# Patient Record
Sex: FEMALE | Race: WHITE | Employment: OTHER | URBAN - NONMETROPOLITAN AREA
[De-identification: names, ages, dates, MRNs, and addresses within clinical notes are randomized per-mention and may not be internally consistent; named-entity substitution may affect disease eponyms.]

---

## 2025-04-16 ENCOUNTER — OFFICE VISIT (OUTPATIENT)
Dept: NEUROLOGY | Age: 85
End: 2025-04-16
Payer: MEDICARE

## 2025-04-16 VITALS
WEIGHT: 131 LBS | BODY MASS INDEX: 21.05 KG/M2 | HEIGHT: 66 IN | HEART RATE: 80 BPM | DIASTOLIC BLOOD PRESSURE: 76 MMHG | SYSTOLIC BLOOD PRESSURE: 136 MMHG | OXYGEN SATURATION: 97 %

## 2025-04-16 DIAGNOSIS — R25.8 BRADYKINESIA: ICD-10-CM

## 2025-04-16 DIAGNOSIS — G20.A2 PARKINSON'S DISEASE WITHOUT DYSKINESIA, WITH FLUCTUATING MANIFESTATIONS (HCC): Primary | ICD-10-CM

## 2025-04-16 DIAGNOSIS — R49.8 HYPOPHONIA: ICD-10-CM

## 2025-04-16 DIAGNOSIS — R26.89 SHUFFLING GAIT: ICD-10-CM

## 2025-04-16 DIAGNOSIS — R25.1 TREMOR OF RIGHT HAND: ICD-10-CM

## 2025-04-16 DIAGNOSIS — M89.9 DISORDER OF BONE, UNSPECIFIED: ICD-10-CM

## 2025-04-16 DIAGNOSIS — R29.898 WEAKNESS OF BOTH HANDS: ICD-10-CM

## 2025-04-16 PROCEDURE — 1159F MED LIST DOCD IN RCRD: CPT | Performed by: PSYCHIATRY & NEUROLOGY

## 2025-04-16 PROCEDURE — 4004F PT TOBACCO SCREEN RCVD TLK: CPT | Performed by: PSYCHIATRY & NEUROLOGY

## 2025-04-16 PROCEDURE — 1090F PRES/ABSN URINE INCON ASSESS: CPT | Performed by: PSYCHIATRY & NEUROLOGY

## 2025-04-16 PROCEDURE — G8400 PT W/DXA NO RESULTS DOC: HCPCS | Performed by: PSYCHIATRY & NEUROLOGY

## 2025-04-16 PROCEDURE — G8420 CALC BMI NORM PARAMETERS: HCPCS | Performed by: PSYCHIATRY & NEUROLOGY

## 2025-04-16 PROCEDURE — 99205 OFFICE O/P NEW HI 60 MIN: CPT | Performed by: PSYCHIATRY & NEUROLOGY

## 2025-04-16 PROCEDURE — G8427 DOCREV CUR MEDS BY ELIG CLIN: HCPCS | Performed by: PSYCHIATRY & NEUROLOGY

## 2025-04-16 PROCEDURE — 1123F ACP DISCUSS/DSCN MKR DOCD: CPT | Performed by: PSYCHIATRY & NEUROLOGY

## 2025-04-16 RX ORDER — MULTIVITAMIN WITH IRON
1 TABLET ORAL DAILY
COMMUNITY

## 2025-04-16 RX ORDER — CARBIDOPA AND LEVODOPA 25; 100 MG/1; MG/1
1 TABLET ORAL 3 TIMES DAILY
Qty: 90 TABLET | Refills: 3 | Status: SHIPPED | OUTPATIENT
Start: 2025-04-16

## 2025-04-16 RX ORDER — LOSARTAN POTASSIUM AND HYDROCHLOROTHIAZIDE 12.5; 1 MG/1; MG/1
1 TABLET ORAL 2 TIMES DAILY
COMMUNITY

## 2025-04-16 RX ORDER — CALCIUM CARBONATE/VITAMIN D3 600 MG-10
2 TABLET ORAL DAILY
COMMUNITY

## 2025-04-16 RX ORDER — LEVOTHYROXINE SODIUM 75 UG/1
75 TABLET ORAL
COMMUNITY

## 2025-04-16 RX ORDER — GLUCOSAMINE SULFATE DIPOT CHLR 1000 MG
2 TABLET ORAL DAILY
COMMUNITY

## 2025-04-16 RX ORDER — ASCORBIC ACID 500 MG
500 TABLET ORAL DAILY
COMMUNITY

## 2025-04-16 RX ORDER — AMLODIPINE BESYLATE 5 MG/1
5 TABLET ORAL DAILY
COMMUNITY
Start: 2025-02-10

## 2025-04-16 RX ORDER — PRAVASTATIN SODIUM 40 MG
40 TABLET ORAL DAILY
COMMUNITY
Start: 2025-03-06

## 2025-04-16 RX ORDER — MULTIVIT WITH MINERALS/LUTEIN
400 TABLET ORAL DAILY
COMMUNITY

## 2025-04-16 RX ORDER — METOPROLOL SUCCINATE 50 MG/1
50 TABLET, EXTENDED RELEASE ORAL DAILY
COMMUNITY
Start: 2025-02-10

## 2025-04-16 NOTE — PATIENT INSTRUCTIONS
MRI cervical spine WO contrast  Start Sinemet 25/100 mg three times a day, take at 7am, 12pm, and 5 pm daily  Vitamin B12, folate  Vitamin B6 level  Vitamin D level  Referral to physical  therapy re: parkinson's related exercises.   Call with any new symptoms or concerns.   Follow up in 4-6 weeks

## 2025-04-16 NOTE — PROGRESS NOTES
Chief Complaint   Patient presents with    New Patient     Tremor        Anais Arshad is a 84 y.o. female who presents today for evaluation of right hand resting tremor, for the past 3-4 years that has progressively gotten worse. She feels slower with her walking, no falls. Her voice pitch is lower.   Her hand writing is smaller. She struggles getting out of chairs and low lying couches. She shuffles her feet when she walks. No dysphagia. No hypersalivation, no vivid dream or hallucination. She has history of MS diagnosed in 2006, she has not seen neurology since. She had never been on medication for the MS. She   denies chest pain. No shortness of breath, no neck pain. No vision changes. No dysphagia. No fever. No rash. No weight loss.  History provided by patient accompanied by her      Past Medical History:   Diagnosis Date    Hearing loss     Hypertension     Multiple sclerosis (HCC)     Tremor        There is no problem list on file for this patient.      Allergies   Allergen Reactions    Erythromycin Nausea Only     PT STATES CAN TAKE ZITHROMAX OKAY    Penicillins Hives and Rash    Sulfa Antibiotics Hives and Rash       Current Outpatient Medications   Medication Sig Dispense Refill    amLODIPine (NORVASC) 5 MG tablet Take 1 tablet by mouth daily      ascorbic acid (VITAMIN C) 500 MG tablet Take 1 tablet by mouth daily      B Complex-Biotin-FA (BALANCED B-50/FA) TABS Take 1 tablet by mouth daily      levothyroxine (SYNTHROID) 75 MCG tablet Take 1 tablet by mouth every morning (before breakfast)      losartan-hydroCHLOROthiazide (HYZAAR) 100-12.5 MG per tablet Take 1 tablet by mouth 2 times daily      metoprolol succinate (TOPROL XL) 50 MG extended release tablet Take 1 tablet by mouth daily      pravastatin (PRAVACHOL) 40 MG tablet Take 1 tablet by mouth daily      Calcium Carb-Cholecalciferol (CALCIUM CARBONATE+VITAMIN D PO) Take 1 tablet by mouth daily      Nutritional Supplements (GLUCOSAMINE

## 2025-04-18 ENCOUNTER — HOSPITAL ENCOUNTER (OUTPATIENT)
Dept: MRI IMAGING | Age: 85
Discharge: HOME | End: 2025-04-18
Attending: RADIOLOGY

## 2025-04-18 DIAGNOSIS — Z00.6 EXAMINATION FOR NORMAL COMPARISON FOR CLINICAL RESEARCH: ICD-10-CM

## 2025-05-16 ENCOUNTER — RESULTS FOLLOW-UP (OUTPATIENT)
Dept: NEUROLOGY | Age: 85
End: 2025-05-16

## 2025-05-16 ENCOUNTER — HOSPITAL ENCOUNTER (OUTPATIENT)
Dept: MRI IMAGING | Age: 85
Discharge: HOME | End: 2025-05-16
Payer: MEDICARE

## 2025-05-16 DIAGNOSIS — R25.1 TREMOR OF RIGHT HAND: ICD-10-CM

## 2025-05-16 DIAGNOSIS — R49.8 HYPOPHONIA: ICD-10-CM

## 2025-05-16 DIAGNOSIS — G20.A2 PARKINSON'S DISEASE WITHOUT DYSKINESIA, WITH FLUCTUATING MANIFESTATIONS (HCC): ICD-10-CM

## 2025-05-16 DIAGNOSIS — R26.89 SHUFFLING GAIT: ICD-10-CM

## 2025-05-16 DIAGNOSIS — R29.898 WEAKNESS OF BOTH HANDS: ICD-10-CM

## 2025-05-16 DIAGNOSIS — R25.8 BRADYKINESIA: ICD-10-CM

## 2025-05-16 DIAGNOSIS — R93.7 ABNORMAL MRI, CERVICAL SPINE: Primary | ICD-10-CM

## 2025-05-16 PROCEDURE — 72141 MRI NECK SPINE W/O DYE: CPT

## 2025-05-16 NOTE — TELEPHONE ENCOUNTER
Spoke to the patient regarding results. Verbalized understanding. Patient agreeable to proceed with referral to [facility] no provider preference. Please advise.

## 2025-05-23 ENCOUNTER — OFFICE VISIT (OUTPATIENT)
Dept: NEUROLOGY | Age: 85
End: 2025-05-23
Payer: MEDICARE

## 2025-05-23 VITALS
WEIGHT: 131 LBS | DIASTOLIC BLOOD PRESSURE: 72 MMHG | SYSTOLIC BLOOD PRESSURE: 128 MMHG | HEART RATE: 74 BPM | BODY MASS INDEX: 21.05 KG/M2 | OXYGEN SATURATION: 97 % | HEIGHT: 66 IN

## 2025-05-23 DIAGNOSIS — G20.A2 PARKINSON'S DISEASE WITHOUT DYSKINESIA, WITH FLUCTUATING MANIFESTATIONS (HCC): Primary | ICD-10-CM

## 2025-05-23 DIAGNOSIS — R25.1 TREMOR OF RIGHT HAND: ICD-10-CM

## 2025-05-23 DIAGNOSIS — R25.8 BRADYKINESIA: ICD-10-CM

## 2025-05-23 DIAGNOSIS — R49.8 HYPOPHONIA: ICD-10-CM

## 2025-05-23 DIAGNOSIS — R29.898 WEAKNESS OF BOTH HANDS: ICD-10-CM

## 2025-05-23 DIAGNOSIS — R26.89 SHUFFLING GAIT: ICD-10-CM

## 2025-05-23 DIAGNOSIS — R93.7 ABNORMAL MRI, CERVICAL SPINE: ICD-10-CM

## 2025-05-23 PROCEDURE — 1123F ACP DISCUSS/DSCN MKR DOCD: CPT | Performed by: NURSE PRACTITIONER

## 2025-05-23 PROCEDURE — G8400 PT W/DXA NO RESULTS DOC: HCPCS | Performed by: NURSE PRACTITIONER

## 2025-05-23 PROCEDURE — 1090F PRES/ABSN URINE INCON ASSESS: CPT | Performed by: NURSE PRACTITIONER

## 2025-05-23 PROCEDURE — G8427 DOCREV CUR MEDS BY ELIG CLIN: HCPCS | Performed by: NURSE PRACTITIONER

## 2025-05-23 PROCEDURE — G8420 CALC BMI NORM PARAMETERS: HCPCS | Performed by: NURSE PRACTITIONER

## 2025-05-23 PROCEDURE — 99214 OFFICE O/P EST MOD 30 MIN: CPT | Performed by: NURSE PRACTITIONER

## 2025-05-23 PROCEDURE — 1159F MED LIST DOCD IN RCRD: CPT | Performed by: NURSE PRACTITIONER

## 2025-05-23 PROCEDURE — 1036F TOBACCO NON-USER: CPT | Performed by: NURSE PRACTITIONER

## 2025-05-23 RX ORDER — CARBIDOPA AND LEVODOPA 25; 100 MG/1; MG/1
1.5 TABLET ORAL 3 TIMES DAILY
Qty: 135 TABLET | Refills: 3 | Status: SHIPPED | OUTPATIENT
Start: 2025-05-23

## 2025-05-23 NOTE — PROGRESS NOTES
Weight: 59.4 kg (131 lb)   Height: 1.676 m (5' 6\")     General Appearance:  alert and cooperative  Gen: NAD, Language is Intact. Skin: no rash, lesion, dry  t to touch. warm  Head: no rash, no icterus  Neck: There is no carotid bruits. The Neck is supple.     Neuro: CN 2-12 grossly intact with no focal deficits. Power 5/5 Throughout symmetric, Reflexes are +2 symmetric. Long tracts are intact. Cerebellar exam is Intact. Sensory exam is intact to light touch.  Gait is intact (improved), .+ve right hand> than left resting tremor,(improved) +ve right hand pinrolling(improved) +be bradykinesia,(improved) +ve Hypohonia(improved), +ve decreased blink rate(improved), +ve difficulty exiting chair(improved), +ve decreased arm swing, +ve stooped forward(improved),   Musculoskeletal:  Has no hand arthritis, no limitation of ROM in any of the four extremities   Lower extremities no  edema          DATA:           Results for orders placed during the hospital encounter of 05/16/25    MRI CERVICAL SPINE WO CONTRAST    Narrative  PROCEDURE: MRI CERVICAL SPINE WO CONTRAST    CLINICAL INFORMATION: Parkinson's disease without dyskinesia, with fluctuations  (HCC); Tremor, unspecified; Other abnormalities of gait and mobility; Other  abnormal involuntary movements; Other voice and resonance disorders; Other  symptoms and signs involving the musculoskeletal system . Bilateral hand  weakness. Right hand tremor.    COMPARISON: There are no relevant prior examinations for comparison.    TECHNIQUE: Sagittal T1, T2 and STIR sequences were obtained through the cervical  spine. Axial fast spin echo and gradient echo T2-weighted images were obtained.    FINDINGS:        There is 2 mm of anterolisthesis of C4 on C5. There is a slight retrolisthesis  of C5 on C6. There is complete loss of disc height at the C4-5, C5-6 and C6-7  levels. There are degenerative marrow changes in the endplates at those levels.  This includes edema at the C4-5 and

## 2025-05-23 NOTE — PATIENT INSTRUCTIONS
Change Sinemet 25/100 mg 1.5 tablet three times a day, take every 5 hours while awake, take at 7am, 12pm, and 5 pm daily  Referral to spine specialist, Lázaro Ragsdale re: MRI cervical spine findings showing  Moderate severity spinal canal stenosis with moderate severity bilateral foraminal stenosis at the C4-5 level.   Referral to physical  therapy re: parkinson's related exercises.   Call with any new symptoms or concerns.   Follow up in 8 weeks

## 2025-07-18 ENCOUNTER — OFFICE VISIT (OUTPATIENT)
Dept: NEUROLOGY | Age: 85
End: 2025-07-18
Payer: MEDICARE

## 2025-07-18 VITALS
DIASTOLIC BLOOD PRESSURE: 60 MMHG | OXYGEN SATURATION: 96 % | SYSTOLIC BLOOD PRESSURE: 130 MMHG | HEIGHT: 66 IN | HEART RATE: 75 BPM | WEIGHT: 125.5 LBS | BODY MASS INDEX: 20.17 KG/M2

## 2025-07-18 DIAGNOSIS — R25.8 BRADYKINESIA: ICD-10-CM

## 2025-07-18 DIAGNOSIS — R93.7 ABNORMAL MRI, CERVICAL SPINE: ICD-10-CM

## 2025-07-18 DIAGNOSIS — R26.89 SHUFFLING GAIT: ICD-10-CM

## 2025-07-18 DIAGNOSIS — G20.A2 PARKINSON'S DISEASE WITHOUT DYSKINESIA, WITH FLUCTUATING MANIFESTATIONS (HCC): Primary | ICD-10-CM

## 2025-07-18 DIAGNOSIS — R25.1 TREMOR OF RIGHT HAND: ICD-10-CM

## 2025-07-18 DIAGNOSIS — R49.8 HYPOPHONIA: ICD-10-CM

## 2025-07-18 PROCEDURE — G8427 DOCREV CUR MEDS BY ELIG CLIN: HCPCS | Performed by: NURSE PRACTITIONER

## 2025-07-18 PROCEDURE — G8420 CALC BMI NORM PARAMETERS: HCPCS | Performed by: NURSE PRACTITIONER

## 2025-07-18 PROCEDURE — 1123F ACP DISCUSS/DSCN MKR DOCD: CPT | Performed by: NURSE PRACTITIONER

## 2025-07-18 PROCEDURE — 99214 OFFICE O/P EST MOD 30 MIN: CPT | Performed by: NURSE PRACTITIONER

## 2025-07-18 PROCEDURE — 1159F MED LIST DOCD IN RCRD: CPT | Performed by: NURSE PRACTITIONER

## 2025-07-18 PROCEDURE — 1036F TOBACCO NON-USER: CPT | Performed by: NURSE PRACTITIONER

## 2025-07-18 PROCEDURE — 1090F PRES/ABSN URINE INCON ASSESS: CPT | Performed by: NURSE PRACTITIONER

## 2025-07-18 PROCEDURE — G8400 PT W/DXA NO RESULTS DOC: HCPCS | Performed by: NURSE PRACTITIONER

## 2025-07-18 NOTE — PATIENT INSTRUCTIONS
Continue with Sinemet 25/100 mg 1.5 tablet three times a day, take every 5 hours while awake, take at 7am, 12pm, and 5 pm daily  Proceed with referral to spine specialist re: MRI cervical spine findings showing  Moderate severity spinal canal stenosis with moderate severity bilateral foraminal stenosis at the C4-5 level.   Proceed with referral to physical  therapy re: parkinson's related exercises.   You need to stay physically active as discussed  Call with any new symptoms or concerns.   Follow up in 3 months

## 2025-07-18 NOTE — PROGRESS NOTES
NEUROLOGY OUT PATIENT FOLLOW UP NOTE:  7/18/202512:10 PM    Anais Arshad is here for follow up for  parkinson's disease, tremor, shuffling gait, bradykinesia, hypophonia.            Allergies   Allergen Reactions    Erythromycin Nausea Only     PT STATES CAN TAKE ZITHROMAX OKAY    Penicillins Hives and Rash    Sulfa Antibiotics Hives and Rash       Current Outpatient Medications:     carbidopa-levodopa (SINEMET)  MG per tablet, Take 1.5 tablets by mouth 3 times daily, Disp: 135 tablet, Rfl: 3    amLODIPine (NORVASC) 5 MG tablet, Take 1 tablet by mouth daily, Disp: , Rfl:     ascorbic acid (VITAMIN C) 500 MG tablet, Take 1 tablet by mouth daily, Disp: , Rfl:     B Complex-Biotin-FA (BALANCED B-50/FA) TABS, Take 1 tablet by mouth daily, Disp: , Rfl:     levothyroxine (SYNTHROID) 75 MCG tablet, Take 1 tablet by mouth every morning (before breakfast), Disp: , Rfl:     losartan-hydroCHLOROthiazide (HYZAAR) 100-12.5 MG per tablet, Take 1 tablet by mouth 2 times daily, Disp: , Rfl:     metoprolol succinate (TOPROL XL) 50 MG extended release tablet, Take 1 tablet by mouth daily, Disp: , Rfl:     pravastatin (PRAVACHOL) 40 MG tablet, Take 1 tablet by mouth daily, Disp: , Rfl:     Calcium Carb-Cholecalciferol (CALCIUM CARBONATE+VITAMIN D PO), Take 1 tablet by mouth daily, Disp: , Rfl:     Nutritional Supplements (GLUCOSAMINE COMPLEX PO), Take 2 tablets by mouth daily, Disp: , Rfl:     Omega 3 1200 MG CAPS, Take 2 tablets by mouth daily, Disp: , Rfl:     vitamin E 400 UNIT capsule, Take 1 capsule by mouth daily, Disp: , Rfl:     Methylsulfonylmethane (MSM) 1000 MG TABS, Take 2 tablets by mouth daily, Disp: , Rfl:     I reviewed the past medical history, allergies, medications, social history and family history.       PE:   Vitals:    07/18/25 1141   BP: 130/60   BP Site: Left Upper Arm   Patient Position: Sitting   BP Cuff Size: Medium Adult   Pulse: 75   SpO2: 96%   Weight: 56.9 kg (125 lb 8 oz)   Height: 1.676 m (5'